# Patient Record
Sex: FEMALE | Race: WHITE | NOT HISPANIC OR LATINO | Employment: UNEMPLOYED | URBAN - METROPOLITAN AREA
[De-identification: names, ages, dates, MRNs, and addresses within clinical notes are randomized per-mention and may not be internally consistent; named-entity substitution may affect disease eponyms.]

---

## 2017-07-12 ENCOUNTER — TRANSCRIBE ORDERS (OUTPATIENT)
Dept: ADMINISTRATIVE | Facility: HOSPITAL | Age: 67
End: 2017-07-12

## 2017-07-12 DIAGNOSIS — R20.0 NUMBNESS: ICD-10-CM

## 2017-07-12 DIAGNOSIS — M54.2 NECK PAIN: Primary | ICD-10-CM

## 2017-07-18 ENCOUNTER — HOSPITAL ENCOUNTER (OUTPATIENT)
Dept: RADIOLOGY | Facility: HOSPITAL | Age: 67
Discharge: HOME | End: 2017-07-18
Attending: INTERNAL MEDICINE
Payer: MEDICARE

## 2017-07-18 DIAGNOSIS — M54.2 NECK PAIN: ICD-10-CM

## 2017-07-18 DIAGNOSIS — R20.0 NUMBNESS: ICD-10-CM

## 2017-07-18 PROCEDURE — 72141 MRI NECK SPINE W/O DYE: CPT

## 2017-11-14 ENCOUNTER — TRANSCRIBE ORDERS (OUTPATIENT)
Dept: ADMINISTRATIVE | Facility: HOSPITAL | Age: 67
End: 2017-11-14

## 2017-11-14 DIAGNOSIS — Z12.31 VISIT FOR SCREENING MAMMOGRAM: Primary | ICD-10-CM

## 2017-11-14 DIAGNOSIS — M81.0 SENILE OSTEOPOROSIS: ICD-10-CM

## 2017-11-27 ENCOUNTER — HOSPITAL ENCOUNTER (OUTPATIENT)
Dept: RADIOLOGY | Facility: HOSPITAL | Age: 67
Discharge: HOME | End: 2017-11-27
Attending: OBSTETRICS & GYNECOLOGY
Payer: MEDICARE

## 2017-11-27 DIAGNOSIS — Z12.31 VISIT FOR SCREENING MAMMOGRAM: ICD-10-CM

## 2017-11-27 DIAGNOSIS — M81.0 SENILE OSTEOPOROSIS: ICD-10-CM

## 2017-11-27 PROCEDURE — G0202 SCR MAMMO BI INCL CAD: HCPCS

## 2017-11-27 PROCEDURE — 77080 DXA BONE DENSITY AXIAL: CPT

## 2018-04-02 ENCOUNTER — HOSPITAL ENCOUNTER (OUTPATIENT)
Dept: RADIOLOGY | Facility: HOSPITAL | Age: 68
Discharge: HOME | End: 2018-04-02
Attending: INTERNAL MEDICINE
Payer: MEDICARE

## 2018-04-02 ENCOUNTER — TRANSCRIBE ORDERS (OUTPATIENT)
Dept: ADMINISTRATIVE | Facility: HOSPITAL | Age: 68
End: 2018-04-02

## 2018-04-02 DIAGNOSIS — R05.9 COUGH: Primary | ICD-10-CM

## 2018-04-02 PROCEDURE — 71046 X-RAY EXAM CHEST 2 VIEWS: CPT

## 2018-12-01 ENCOUNTER — TRANSCRIBE ORDERS (OUTPATIENT)
Dept: ADMINISTRATIVE | Facility: HOSPITAL | Age: 68
End: 2018-12-01

## 2018-12-01 DIAGNOSIS — Z12.39 SCREENING BREAST EXAMINATION: Primary | ICD-10-CM

## 2018-12-17 ENCOUNTER — HOSPITAL ENCOUNTER (OUTPATIENT)
Dept: RADIOLOGY | Facility: HOSPITAL | Age: 68
Discharge: HOME | End: 2018-12-17
Attending: OBSTETRICS & GYNECOLOGY
Payer: MEDICARE

## 2018-12-17 VITALS — BODY MASS INDEX: 23.32 KG/M2 | WEIGHT: 140 LBS | HEIGHT: 65 IN

## 2018-12-17 DIAGNOSIS — Z12.39 SCREENING BREAST EXAMINATION: ICD-10-CM

## 2018-12-17 PROCEDURE — 77067 SCR MAMMO BI INCL CAD: CPT

## 2018-12-17 PROCEDURE — 77063 BREAST TOMOSYNTHESIS BI: CPT

## 2019-08-03 ENCOUNTER — OFFICE VISIT (OUTPATIENT)
Dept: SLEEP CENTER | Facility: CLINIC | Age: 69
End: 2019-08-03
Payer: COMMERCIAL

## 2019-08-03 VITALS
SYSTOLIC BLOOD PRESSURE: 143 MMHG | WEIGHT: 149 LBS | DIASTOLIC BLOOD PRESSURE: 65 MMHG | BODY MASS INDEX: 24.83 KG/M2 | HEIGHT: 65 IN | HEART RATE: 61 BPM

## 2019-08-03 DIAGNOSIS — G47.33 OSA (OBSTRUCTIVE SLEEP APNEA): Primary | ICD-10-CM

## 2019-08-03 DIAGNOSIS — Z91.09 ENVIRONMENTAL ALLERGIES: ICD-10-CM

## 2019-08-03 DIAGNOSIS — R06.83 SNORING: ICD-10-CM

## 2019-08-03 DIAGNOSIS — I25.10 CORONARY ARTERY DISEASE INVOLVING NATIVE CORONARY ARTERY OF NATIVE HEART WITHOUT ANGINA PECTORIS: ICD-10-CM

## 2019-08-03 DIAGNOSIS — G47.09 OTHER INSOMNIA: ICD-10-CM

## 2019-08-03 DIAGNOSIS — R40.0 DAYTIME SLEEPINESS: ICD-10-CM

## 2019-08-03 DIAGNOSIS — R23.2 HOT FLASHES: ICD-10-CM

## 2019-08-03 PROCEDURE — 99204 OFFICE O/P NEW MOD 45 MIN: CPT | Performed by: INTERNAL MEDICINE

## 2019-08-03 NOTE — PROGRESS NOTES
Review of Systems      Genitourinary post menopausal (no peroids)   Cardiology none   Gastrointestinal none   Neurology none   Constitutional fatigue   Integumentary none   Psychiatry none   Musculoskeletal none   Pulmonary snoring   ENT none   Endocrine none   Hematological none I will STOP taking the medications listed below when I get home from the hospital:    spironolactone 25 mg oral tablet  -- 1 tab(s) by mouth once a day (at bedtime)

## 2019-08-03 NOTE — PATIENT INSTRUCTIONS
What you can do to improve your sleep: (Sleep Hygiene) Basic rules for a good night's sleep    Create a regular sleep schedule  This will help you form a sleep routine  Keep a record of your sleep patterns, and any sleeping problems you have  Bring the record to follow-up visits with healthcare providers  Avoid prolonged use of light-emitting screens before bedtime or watching TV in bed  Avoid forcing sleep  Do not take naps  Naps could make it hard for you to fall asleep at bedtime  Deal with your worries before bedtime  Keep your bedroom cool, quiet, and dark  Turn on white noise, such as a fan, to help you relax  Do not use your bed for any activity that will keep you awake  Do not read, exercise, eat, or watch TV in your bedroom  Get up if you do not fall asleep within 20 minutes  Move to another room and do something relaxing until you become sleepy  Limit caffeine, alcohol, nicotine and food to earlier in the day  Only drink caffeine in the morning  Do not drink alcohol within 6 hours of bedtime  Do not eat a heavy meal right before you go to bed  Avoid smoking, especially in the evening  Exercise regularly  Daily exercise will help you sleep better  Do not exercise within 4 hours of bedtime  Stimulus control therapy rules  1  Go to bed only when sleepy  2  Do not watch television, read, eat, or worry while in bed  Use bed only for sleep and sex  3  Get out of bed if unable to fall asleep within 20 minutes and go to another room  Return to bed only when sleepy  Repeat this step as many times as necessary throughout the night  4  Set an alarm clock to wake up at a fixed time each morning, including weekends  5  Do not take a nap during the day  Data from: [address] [place] [address], [address] Nonpharmacologic treatments of insomnia  J Clin Psychiatry 8479; 53:37  Go to AASM website for more information: Sleepeducation  org     Recommended Reading:  Book by marques Alonso No More sleepless nights    What is NELL? Obstructive sleep apnea is a common and serious sleep disorder that causes you to stop breathing during sleep  The airway repeatedly becomes blocked, limiting the amount of air that reaches your lungs  When this happens, you may snore loudly or making choking noises as you try to breathe  Your brain and body becomes oxygen deprived and you may wake up  This may happen a few times a night, or in more severe cases, several hundred times a night  Sleep apnea can make you wake up in the morning feeling tired or unrefreshed even though you have had a full night of sleep  During the day, you may feel fatigued, have difficulty concentrating or you may even unintentionally fall asleep  This is because your body is waking up numerous times throughout the night, even though you might not be conscious of each awakening  The lack of oxygen your body receives can have negative long-term consequences for your health  This includes:  High blood pressure  Heart disease  Irregular heart rhythms  Stroke  Pre-diabetes and diabetes  Depression    Testing  An objective evaluation of your sleep may be needed before your board certified sleep physician can make a diagnosis  Options include:   In-lab overnight sleep study  This type of sleep study requires you to stay overnight at a sleep center, in a bed that may resemble a hotel room  You will sleep with sensors hooked up to various parts of your body  These sensors record your brain waves, heartbeat, breathing and movement  An overnight sleep study also provides your doctor with the most complete information about your sleep   Learn more about an overnight sleep study      Home sleep apnea test  Some patients with high risk factors for obstructive sleep apnea and no other medical disorders may be candidates for a home sleep apnea test  The testing equipment differs in that it is less complicated than what is used in an overnight sleep study  As such, does not give all the data an in-lab will and if negative, may not mean you do not have the problem  Treatment for sleep apnea  includes using a continuous positive airway pressure (CPAP) machine to keep your airway open during sleep  A mask is placed over your nose and mouth, or just your nose  The mask is hooked to the CPAP machine that blows a gentle stream of air into the mask when you breathe  This helps keep your airway open so you can breathe more regularly  Extra oxygen may be given to you through the machine  You may be given a mouth device  It looks like a mouth guard or dental retainer and stops your tongue and mouth tissues from blocking your throat while you sleep  Surgery may be needed to remove extra tissues that block your mouth, throat, or nose  Manage sleep apnea:   Do not smoke  Nicotine and other chemicals in cigarettes and cigars can cause lung damage  Ask your healthcare provider for information if you currently smoke and need help to quit  E-cigarettes or smokeless tobacco still contain nicotine  Talk to your healthcare provider before you use these products  Do not drink alcohol or take sedative medicine before you go to sleep  Alcohol and sedatives can relax the muscles and tissues around your throat  This can block the airflow to your lungs  Maintain a healthy weight  Excess tissue around your throat may restrict your breathing  Ask your healthcare provider for information if you need to lose weight  Sleep on your side or use pillows designed to prevent sleep apnea  This prevents your tongue or other tissues from blocking your throat  You can also raise the head of your bed  Driving Safety  Refrain from driving when drowsy  Follow up with your healthcare provider as directed:  Write down your questions so you remember to ask them during your visits  Go to AASM website for more information: Sleepeducation  org     What is NELL?    Obstructive sleep apnea is a common and serious sleep disorder that causes you to stop breathing during sleep  The airway repeatedly becomes blocked, limiting the amount of air that reaches your lungs  When this happens, you may snore loudly or making choking noises as you try to breathe  Your brain and body becomes oxygen deprived and you may wake up  This may happen a few times a night, or in more severe cases, several hundred times a night  Sleep apnea can make you wake up in the morning feeling tired or unrefreshed even though you have had a full night of sleep  During the day, you may feel fatigued, have difficulty concentrating or you may even unintentionally fall asleep  This is because your body is waking up numerous times throughout the night, even though you might not be conscious of each awakening  The lack of oxygen your body receives can have negative long-term consequences for your health  This includes:  High blood pressure  Heart disease  Irregular heart rhythms  Stroke  Pre-diabetes and diabetes  Depression    Testing  An objective evaluation of your sleep may be needed before your board certified sleep physician can make a diagnosis  Options include:   In-lab overnight sleep study  This type of sleep study requires you to stay overnight at a sleep center, in a bed that may resemble a hotel room  You will sleep with sensors hooked up to various parts of your body  These sensors record your brain waves, heartbeat, breathing and movement  An overnight sleep study also provides your doctor with the most complete information about your sleep  Learn more about an overnight sleep study      Home sleep apnea test  Some patients with high risk factors for obstructive sleep apnea and no other medical disorders may be candidates for a home sleep apnea test  The testing equipment differs in that it is less complicated than what is used in an overnight sleep study   As such, does not give all the data an in-lab will and if negative, may not mean you do not have the problem  Treatment for sleep apnea  includes using a continuous positive airway pressure (CPAP) machine to keep your airway open during sleep  A mask is placed over your nose and mouth, or just your nose  The mask is hooked to the CPAP machine that blows a gentle stream of air into the mask when you breathe  This helps keep your airway open so you can breathe more regularly  Extra oxygen may be given to you through the machine  You may be given a mouth device  It looks like a mouth guard or dental retainer and stops your tongue and mouth tissues from blocking your throat while you sleep  Surgery may be needed to remove extra tissues that block your mouth, throat, or nose  Manage sleep apnea:   Do not smoke  Nicotine and other chemicals in cigarettes and cigars can cause lung damage  Ask your healthcare provider for information if you currently smoke and need help to quit  E-cigarettes or smokeless tobacco still contain nicotine  Talk to your healthcare provider before you use these products  Do not drink alcohol or take sedative medicine before you go to sleep  Alcohol and sedatives can relax the muscles and tissues around your throat  This can block the airflow to your lungs  Maintain a healthy weight  Excess tissue around your throat may restrict your breathing  Ask your healthcare provider for information if you need to lose weight  Sleep on your side or use pillows designed to prevent sleep apnea  This prevents your tongue or other tissues from blocking your throat  You can also raise the head of your bed  Driving Safety  Refrain from driving when drowsy  Follow up with your healthcare provider as directed:  Write down your questions so you remember to ask them during your visits  Go to AASM website for more information: Sleepeducation  org

## 2019-08-03 NOTE — PROGRESS NOTES
Consultation - [facility]  81 y o  female  [phone]    Physician Requesting Consult: Mateus Guevara MD   PCP: Jose Manuel Dillard MD    Reason for Consult : At your kind request I saw this patient for initial sleep evaluation today  The patient is here to evaluate for suspected Obstructive Sleep Apnea  She has had several studies in the past:  A diagnostic sleep study in 2012 demonstrated an AHI of 19 9 per hour with minimum oxygen saturation of 92%  Very loud intensity was snoring was noted and was independently associated with arousal   A subsequent diagnostic study in 2017 demonstrated an AHI of 0 2 per hour with mild intermittent snoring  There were 2 respiratory effort-related arousals resulting in an RDI of 0 6 per hour  Minimum oxygen saturation was 91%  Sleep efficiency was reduced at 67% ECG demonstrated bradycardia, PVCs and PACs          PFSH, Problem List, Medications & Allergies were reviewed in EMR  She  has a past medical history of Stomach problems  She has a current medication list which includes the following prescription(s): esomeprazole, metoprolol succinate, pseudoephedrine-acetaminophen, and rosuvastatin  HPI:  She reports symptom recurrence and progressing over time  She  is once again snoring loudly and irrespective of her sleep position  Snoring disturbs her  and at times she awakens herself with snoring  She is not aware of breathing difficulties during sleep or modifying factors  Other Complaints:  Tiredness and poor sleep  Yasmin Campa Restless Leg Syndrome: reports no suggestive symptoms but moves excessively during sleep  Parasomnia: no features reported   Sleep Routine:   Typical Bedtime:  11:00 p m  Gets OOB:  8:00 a m  TIB:9 hrs  Sleep latency:> 60 minutes and attributes to my mind does not turn off  At times she has the TV on to assist falling asleep    Sleep Interruptions:2-3 x/night is unsure of the cause and struggles to fall back asleep  Awakens: spontaneously  Upon awakening:never feels rested She estimates getting 5 hrs sleep  She has Excessive Daytime Sleepiness and dozes off when sedentary  On review, [place] Sleepiness Scale rated at 12 /24  Habits: reports that she has never smoked  She has never used smokeless tobacco , reports that she drinks alcohol ,  reports that she does not use drugs  ,Caffeine use:limited , Exercise routine: regular    Family History:  Father and siblings snored  ROS: reviewed & as attached  Significant for intentional weight loss of around 15 lb in the past 1 year  She has environmental allergies for which she uses Xyzal p r n  Samantha Cutting She has hot flashes that disturb sleep  She is on medication for acid reflux  EXAM:    Vitals /65   Pulse 61   Ht 5' 5" (1 651 m)   Wt 67 6 kg (149 lb)   BMI 24 79 kg/m²     General  Well groomed female; appears stated age; no apparent distress  Psychiatric   Speech:clear and coherent; Cooperative  Normal mood, affect & thought    Head   Craniofacial anatomy:normal Sinuses: non- tender  TMJ: Normal     Eyes   EOM's intact;  conjunctiva/corneas clear         Nasal Airway  narrow nares Septum:central, Mucous membranes:appear normal     Turbinates: normal ;  No Rhinorrhea; No PND  Oral   Airway  crowded Tongue:  ; Modified Mallampati class III (soft and hard palate and base of uvula visible)  Hard Palate:normal ;Soft Palate:  redundant soft palate  Tonsils: no hypertrophy  Teeth: normal       Neck  is lean; Neck Circumference: 14 5 "; Supple; no abnormal masses; Thyroid:normal  Trachea:central      Lymph    No Cervical or Submandibular Lymhadenopathy   Heart:   S1,S2 normal;RRR; no gallop; nomurmurs     Lungs   Respiratory Effort:normal  Air entry good bilaterally  No wheezes  No rales   Abdomen   Obese, Soft & non-tender     Extremities   No pedal edema  No clubbing or cyanosis  Skin   Skin is warm and dry;  Color& Hydration good; no facial rashes or lesions    Neurologic  Alert and orientated; CNII-XII intact; Motor normal; Sensation normal    Muscskeltl    Muscle bulk, tone and power WNL Gait:normal           IMPRESSION: Primary Sleep/Secondary(to Medical or Psych conditions) & comorbidities   1  NELL (obstructive sleep apnea)  Diagnostic Sleep Study    CPAP Study   2  Snoring  Ambulatory referral to Sleep Studies   3  Daytime sleepiness  Ambulatory referral to Sleep Studies   4  Other insomnia     5  Environmental allergies     6  Hot flashes     7  Coronary artery disease involving native coronary artery of native heart without angina pectoris        PLAN:  1  Comprehensive counseling was provided on pathophysiology, diagnostic strategies & treatment options; effects on symptoms and comorbidities; risks of inadequate therapy; costs and insurance aspects  2  I advised on weight reduction, avoiding sleeping supine, using alcohol or sedating medications close to bed time and on safe driving practices  3  Cognitive behavioral therapy was initiated with advise on Sleep Hygiene and behavioral techniques to manage Insomnia  Specifically, limiting time in bed to 7-1/2 hours or less and on relaxation techniques  4  Nocturnal polysomnography is indicated and a diagnostic study will be scheduled  5  Patient is willing to try Positive airway pressure therapy and will be scheduled for a titration study if indicated  6  Nasal symptoms may improve with regular nasal saline rinse followed by topical nasal steroid if necessary  6  Follow-up will be scheduled after the studies to review results, further details of treatment options and to initiate/adjust therapy  Thank you for allowing me to participate in the care of this patient  I will keep you apprised of developments              Sincerely,     Authenticated electronically by Clifford Kaplan MD   on 76/94/62   Board Certified Specialist

## 2019-08-21 ENCOUNTER — TELEPHONE (OUTPATIENT)
Dept: SLEEP CENTER | Facility: CLINIC | Age: 69
End: 2019-08-21

## 2019-08-21 ENCOUNTER — TRANSCRIBE ORDERS (OUTPATIENT)
Dept: SLEEP CENTER | Facility: CLINIC | Age: 69
End: 2019-08-21

## 2019-08-26 DIAGNOSIS — G47.33 OSA (OBSTRUCTIVE SLEEP APNEA): Primary | ICD-10-CM

## 2019-09-03 ENCOUNTER — HOSPITAL ENCOUNTER (OUTPATIENT)
Dept: SLEEP CENTER | Facility: CLINIC | Age: 69
Discharge: HOME | End: 2019-09-03
Payer: COMMERCIAL

## 2019-09-03 DIAGNOSIS — G47.33 OSA (OBSTRUCTIVE SLEEP APNEA): ICD-10-CM

## 2019-09-03 PROCEDURE — G0399 HOME SLEEP TEST/TYPE 3 PORTA: HCPCS

## 2019-09-09 DIAGNOSIS — G47.33 OSA (OBSTRUCTIVE SLEEP APNEA): Primary | ICD-10-CM

## 2019-09-10 ENCOUNTER — TELEPHONE (OUTPATIENT)
Dept: SLEEP CENTER | Facility: CLINIC | Age: 69
End: 2019-09-10

## 2019-09-10 NOTE — TELEPHONE ENCOUNTER
Spoke with patient, advised sleep study did not reveal NELL, cancelled CPAP study  Patient will call as needed

## 2019-11-22 ENCOUNTER — TRANSCRIBE ORDERS (OUTPATIENT)
Dept: ADMINISTRATIVE | Facility: HOSPITAL | Age: 69
End: 2019-11-22

## 2019-11-22 DIAGNOSIS — Z12.31 SCREENING MAMMOGRAM, ENCOUNTER FOR: Primary | ICD-10-CM

## 2020-01-07 ENCOUNTER — HOSPITAL ENCOUNTER (OUTPATIENT)
Dept: RADIOLOGY | Facility: HOSPITAL | Age: 70
Discharge: HOME | End: 2020-01-07
Attending: OBSTETRICS & GYNECOLOGY
Payer: COMMERCIAL

## 2020-01-07 VITALS — BODY MASS INDEX: 24.83 KG/M2 | WEIGHT: 149 LBS | HEIGHT: 65 IN

## 2020-01-07 DIAGNOSIS — Z12.31 SCREENING MAMMOGRAM, ENCOUNTER FOR: ICD-10-CM

## 2020-01-07 PROCEDURE — 77063 BREAST TOMOSYNTHESIS BI: CPT

## 2020-01-07 PROCEDURE — 77067 SCR MAMMO BI INCL CAD: CPT

## 2020-11-20 ENCOUNTER — TRANSCRIBE ORDERS (OUTPATIENT)
Dept: ADMINISTRATIVE | Facility: HOSPITAL | Age: 70
End: 2020-11-20

## 2020-11-20 DIAGNOSIS — Z12.31 ENCOUNTER FOR SCREENING MAMMOGRAM FOR MALIGNANT NEOPLASM OF BREAST: Primary | ICD-10-CM

## 2021-01-14 ENCOUNTER — HOSPITAL ENCOUNTER (OUTPATIENT)
Dept: RADIOLOGY | Facility: HOSPITAL | Age: 71
Discharge: HOME | End: 2021-01-14
Attending: OBSTETRICS & GYNECOLOGY
Payer: COMMERCIAL

## 2021-01-14 ENCOUNTER — HOSPITAL ENCOUNTER (OUTPATIENT)
Dept: RADIOLOGY | Facility: HOSPITAL | Age: 71
Discharge: HOME | End: 2021-01-14
Payer: COMMERCIAL

## 2021-01-14 VITALS — WEIGHT: 140 LBS | BODY MASS INDEX: 23.32 KG/M2 | HEIGHT: 65 IN

## 2021-01-14 DIAGNOSIS — N63.20 LUMP OF BREAST, LEFT: ICD-10-CM

## 2021-01-14 DIAGNOSIS — Z12.31 ENCOUNTER FOR SCREENING MAMMOGRAM FOR MALIGNANT NEOPLASM OF BREAST: ICD-10-CM

## 2021-01-14 PROCEDURE — 77066 DX MAMMO INCL CAD BI: CPT

## 2021-01-14 PROCEDURE — 76642 ULTRASOUND BREAST LIMITED: CPT

## 2021-01-14 PROCEDURE — G0279 TOMOSYNTHESIS, MAMMO: HCPCS

## 2021-03-01 DIAGNOSIS — Z23 ENCOUNTER FOR IMMUNIZATION: ICD-10-CM

## 2021-03-05 ENCOUNTER — IMMUNIZATIONS (OUTPATIENT)
Dept: FAMILY MEDICINE CLINIC | Facility: HOSPITAL | Age: 71
End: 2021-03-05

## 2021-03-05 DIAGNOSIS — Z23 ENCOUNTER FOR IMMUNIZATION: Primary | ICD-10-CM

## 2021-03-05 PROCEDURE — 91301 SARS-COV-2 / COVID-19 MRNA VACCINE (MODERNA) 100 MCG: CPT

## 2021-03-05 PROCEDURE — 0011A SARS-COV-2 / COVID-19 MRNA VACCINE (MODERNA) 100 MCG: CPT

## 2021-04-02 ENCOUNTER — IMMUNIZATIONS (OUTPATIENT)
Dept: FAMILY MEDICINE CLINIC | Facility: HOSPITAL | Age: 71
End: 2021-04-02

## 2021-04-02 DIAGNOSIS — Z23 ENCOUNTER FOR IMMUNIZATION: Primary | ICD-10-CM

## 2021-04-02 PROCEDURE — 0012A SARS-COV-2 / COVID-19 MRNA VACCINE (MODERNA) 100 MCG: CPT

## 2021-04-02 PROCEDURE — 91301 SARS-COV-2 / COVID-19 MRNA VACCINE (MODERNA) 100 MCG: CPT

## 2022-06-27 ENCOUNTER — HOSPITAL ENCOUNTER (OUTPATIENT)
Dept: RADIOLOGY | Facility: HOSPITAL | Age: 72
Discharge: HOME | End: 2022-06-27
Payer: COMMERCIAL

## 2022-06-27 VITALS — BODY MASS INDEX: 23.32 KG/M2 | WEIGHT: 140 LBS | HEIGHT: 65 IN

## 2022-06-27 DIAGNOSIS — Z12.31 ENCOUNTER FOR SCREENING MAMMOGRAM FOR MALIGNANT NEOPLASM OF BREAST: ICD-10-CM

## 2022-06-27 PROCEDURE — 77063 BREAST TOMOSYNTHESIS BI: CPT

## 2022-06-27 PROCEDURE — 77067 SCR MAMMO BI INCL CAD: CPT

## 2022-12-22 ENCOUNTER — TELEPHONE (OUTPATIENT)
Dept: GASTROENTEROLOGY | Facility: CLINIC | Age: 72
End: 2022-12-22

## 2022-12-22 ENCOUNTER — OFFICE VISIT (OUTPATIENT)
Dept: GASTROENTEROLOGY | Facility: CLINIC | Age: 72
End: 2022-12-22

## 2022-12-22 VITALS
HEIGHT: 65 IN | BODY MASS INDEX: 25.51 KG/M2 | WEIGHT: 153.1 LBS | SYSTOLIC BLOOD PRESSURE: 144 MMHG | HEART RATE: 52 BPM | DIASTOLIC BLOOD PRESSURE: 77 MMHG

## 2022-12-22 DIAGNOSIS — K21.9 GASTROESOPHAGEAL REFLUX DISEASE WITHOUT ESOPHAGITIS: ICD-10-CM

## 2022-12-22 DIAGNOSIS — R19.5 OCCULT BLOOD POSITIVE STOOL: Primary | ICD-10-CM

## 2022-12-22 RX ORDER — POLYETHYLENE GLYCOL 3350, SODIUM SULFATE ANHYDROUS, SODIUM BICARBONATE, SODIUM CHLORIDE, POTASSIUM CHLORIDE 236; 22.74; 6.74; 5.86; 2.97 G/4L; G/4L; G/4L; G/4L; G/4L
4 POWDER, FOR SOLUTION ORAL ONCE
Qty: 4000 ML | Refills: 0 | Status: SHIPPED | OUTPATIENT
Start: 2022-12-22 | End: 2022-12-22

## 2022-12-22 RX ORDER — ASPIRIN 81 MG/1
81 TABLET ORAL DAILY
COMMUNITY

## 2022-12-22 RX ORDER — LISINOPRIL 2.5 MG/1
1.5 TABLET ORAL DAILY
COMMUNITY
Start: 2020-03-31

## 2022-12-22 NOTE — PROGRESS NOTES
[facility] - Outpatient Consultation  Lexus Cea 81 y o  female MRN: [id number]  Encounter: [id number]          ASSESSMENT AND PLAN:      1  Occult blood positive stool  -     Colonoscopy; Future; Expected date: 12/22/2022  -     EGD; Future; Expected date: 12/22/2022  -     polyethylene glycol (Golytely) 4000 mL solution; Take 4,000 mL by mouth once for 1 dose Take according to instructions given by the office for colonoscopy bowel prep  2  Gastroesophageal reflux disease without esophagitis  -     Colonoscopy; Future; Expected date: 12/22/2022  -     EGD; Future; Expected date: 12/22/2022  -     polyethylene glycol (Golytely) 4000 mL solution; Take 4,000 mL by mouth once for 1 dose Take according to instructions given by the office for colonoscopy bowel prep  Patient with FIT positive on routine screening for colorectal cancer, does not recall the last colonoscopy, at least 10 years ago, those records not available  Patient denies any overt GI blood loss or other GI symptoms, history of chronic constipation  Also has chronic acid reflux, on PPI for several years  History of LAD stents, year ago nuclear stress test was read as normal   Patient needs EGD colonoscopy evaluation, procedure and prep discussed at length  She will start taking MiraLAX at least a week before procedure because of history of severe constipation  Her cardiac status seems to be stable, I do not see need for cardiac clearance  Discussed with patient and her  at length   ______________________________________________________________________    HPI:     Patient came in for evaluation of her FIT positive  She denies any apparent blood in stools, no melena hematochezia, denies any hematemesis hemoptysis  Appetite is fair weight stable bowels generally constipated, sometimes does not move her bowels for 4 to 6 days    Does have some heartburn acid reflux indigestion been on PPI for a long time   Denies any dysphagia coughing choking spells nocturnal reflux regurgitation bronchitis pneumonias  Denies any chest pain shortness of breath fever chills rash, reasonably active, history of LAD stents many years ago, sees cardiologist annually  Diet medications more than 10 pertinent systems reviewed  REVIEW OF SYSTEMS:    CONSTITUTIONAL: Denies any fever, chills, rigors, and weight loss  HEENT: No earache or tinnitus  CARDIOVASCULAR: No chest pain or palpitations  RESPIRATORY: Denies any cough, hemoptysis, shortness of breath or dyspnea on exertion  GASTROINTESTINAL: As noted in the History of Present Illness  GENITOURINARY: Denies any hematuria or dysuria  NEUROLOGIC: No dizziness or vertigo  MUSCULOSKELETAL: Denies any joint swellings  SKIN: Denies skin rashes or itching  ENDOCRINE: Denies excessive thirst  Denies intolerance to heat or cold  PSYCHOSOCIAL: Denies depression or anxiety  Denies any recent memory loss         Historical Information   Past Medical History:   Diagnosis Date   • BRCA1 negative    • Hyperlipidemia    • Hypertension    • Stomach problems      Past Surgical History:   Procedure Laterality Date   • ARTHROSCOPIC REPAIR ACL     • CAROTID STENT     • COLONOSCOPY       Social History   Social History     Substance and Sexual Activity   Alcohol Use Yes    Comment: Rarely      Social History     Substance and Sexual Activity   Drug Use Never     Social History     Tobacco Use   Smoking Status Never   Smokeless Tobacco Never     Family History   Problem Relation Age of Onset   • Breast cancer Maternal Aunt    • Breast cancer Maternal Aunt    • Breast cancer Maternal Aunt    • Heart disease Other    • Congenital heart disease Father        Meds/Allergies       Current Outpatient Medications:   •  esomeprazole (NexIUM) 40 MG capsule  •  lisinopril (ZESTRIL) 2 5 mg tablet  •  metoprolol succinate (TOPROL-XL) 25 mg 24 hr tablet  •  polyethylene glycol (Golytely) 4000 mL solution  •  rosuvastatin (CRESTOR) 20 MG tablet  •  aspirin (ECOTRIN LOW STRENGTH) 81 mg EC tablet  •  pseudoephedrine-acetaminophen (TYLENOL SINUS)  MG TABS    No Known Allergies        Objective     Blood pressure 144/77, pulse (!) 52, height 5' 5" (1 651 m), weight 69 4 kg (153 lb 1 6 oz)  Body mass index is 25 48 kg/m²  PHYSICAL EXAM:      General Appearance:   Alert, cooperative, no distress   HEENT:   Normocephalic, atraumatic, anicteric  Neck:  Supple, symmetrical, trachea midline   Lungs:   Clear to auscultation bilaterally; no rales, rhonchi or wheezing; respirations unlabored    Heart[de-identified]   Regular rate and rhythm; no murmur  Abdomen:   Soft, non-tender, non-distended; normal bowel sounds; no masses, no organomegaly    Genitalia:   Deferred    Rectal:   Deferred    Extremities:  No cyanosis, clubbing or edema    Skin:  No jaundice, rashes, or lesions    Lymph nodes:  No palpable cervical lymphadenopathy        Lab Results:   No visits with results within 1 Day(s) from this visit  Latest known visit with results is:   Conversion Encounter on 10/05/2015   Component Date Value   • eGFR  10/05/2015 >60    • EGFR-AMERICAN CALC 10/05/2015 >60          Radiology Results:   No results found

## 2022-12-22 NOTE — PATIENT INSTRUCTIONS
Scheduled date of EGD/colonoscopy (as of today): 1/18/2023   Physician performing EGD/colonoscopy: elda  Location of EGD/colonoscopy[facility]  Desired bowel prep reviewed with patient:  Instructions reviewed with patient by: katherine  Clearances:   patient has been vaccinated

## 2023-01-12 ENCOUNTER — TELEPHONE (OUTPATIENT)
Dept: GASTROENTEROLOGY | Facility: CLINIC | Age: 73
End: 2023-01-12

## 2023-01-18 ENCOUNTER — APPOINTMENT (OUTPATIENT)
Dept: RADIOLOGY | Facility: CLINIC | Age: 73
End: 2023-01-18

## 2023-01-18 ENCOUNTER — OFFICE VISIT (OUTPATIENT)
Dept: URGENT CARE | Facility: CLINIC | Age: 73
End: 2023-01-18

## 2023-01-18 VITALS
RESPIRATION RATE: 16 BRPM | HEART RATE: 59 BPM | SYSTOLIC BLOOD PRESSURE: 152 MMHG | DIASTOLIC BLOOD PRESSURE: 67 MMHG | OXYGEN SATURATION: 98 % | TEMPERATURE: 98.9 F

## 2023-01-18 DIAGNOSIS — S69.92XA HAND INJURY, LEFT, INITIAL ENCOUNTER: ICD-10-CM

## 2023-01-18 DIAGNOSIS — S62.615A CLOSED DISPLACED FRACTURE OF PROXIMAL PHALANX OF LEFT RING FINGER, INITIAL ENCOUNTER: Primary | ICD-10-CM

## 2023-01-18 DIAGNOSIS — S62.617A CLOSED DISPLACED FRACTURE OF PROXIMAL PHALANX OF LEFT LITTLE FINGER, INITIAL ENCOUNTER: ICD-10-CM

## 2023-01-18 NOTE — PROGRESS NOTES
[id number] [facility]        NAME: Ninfa Mitcehll is a 81 y o  female  : 1950    MRN: [id number]  DATE: 2023  TIME: 12:41 PM    Assessment and Plan   Closed displaced fracture of proximal phalanx of left ring finger, initial encounter [[id number]]  1  Closed displaced fracture of proximal phalanx of left ring finger, initial encounter  XR hand 3+ vw left    Foreign body removal    Splint application      2  Closed displaced fracture of proximal phalanx of left little finger, initial encounter  XR hand 3+ vw left    Splint application        Utilized ring cutter due to location of fractures as well as significant swelling present  Pt has her own ortho she will call for appointment  Recommend jaiden [facility]  Discussed strict return to care precautions as well as red flag symptoms which should prompt immediate ED referral  Pt verbalized understanding and is in agreement with plan  Please follow up with your primary care provider within the next week  Please remember that your visit today was with an urgent care provider and should not replace follow up with your primary care provider for chronic medical issues or annual physicals  Patient Instructions       Follow up with PCP in 3-5 days  Proceed to  ER if symptoms worsen  Chief Complaint     Chief Complaint   Patient presents with   • Hand Injury     Nikki Prost on left hand this morning, left ring and pinky fingers swollen, limited ROM, mild pain in middle and index fingers, full snesation         History of Present Illness       Pt is a 82 yo female with pmh HTN, HLD pw L hand pain x 1 hour  Tripped over the door frame in the garage and had [address]  Attempted to get ring off of 4th finger immediately but it was too swollen  Denies head strike or LOC  Review of Systems   Review of Systems   Constitutional: Negative for chills and diaphoresis  Respiratory: Negative for shortness of breath  Cardiovascular: Negative for chest pain  Gastrointestinal: Negative for nausea and vomiting  Musculoskeletal: Positive for joint swelling  Negative for arthralgias, back pain, myalgias and neck pain  Neurological: Negative for weakness and numbness  Current Medications       Current Outpatient Medications:   •  aspirin (ECOTRIN LOW STRENGTH) 81 mg EC tablet, Take 81 mg by mouth daily, Disp: , Rfl:   •  esomeprazole (NexIUM) 40 MG capsule, , Disp: , Rfl:   •  lisinopril (ZESTRIL) 2 5 mg tablet, Take 1 5 tablets by mouth daily, Disp: , Rfl:   •  metoprolol succinate (TOPROL-XL) 25 mg 24 hr tablet, , Disp: , Rfl:   •  rosuvastatin (CRESTOR) 20 MG tablet, , Disp: , Rfl:   •  polyethylene glycol (Golytely) 4000 mL solution, Take 4,000 mL by mouth once for 1 dose Take according to instructions given by the office for colonoscopy bowel prep , Disp: 4000 mL, Rfl: 0  •  pseudoephedrine-acetaminophen (TYLENOL SINUS)  MG TABS, Take 1 tablet by mouth every 4 (four) hours as needed for congestion, Disp: , Rfl:     Current Allergies     Allergies as of 01/18/2023   • (No Known Allergies)            The following portions of the patient's history were reviewed and updated as appropriate: allergies, current medications, past family history, past medical history, past social history, past surgical history and problem list      Past Medical History:   Diagnosis Date   • BRCA1 negative    • Hyperlipidemia    • Hypertension    • Stomach problems        Past Surgical History:   Procedure Laterality Date   • ARTHROSCOPIC REPAIR ACL     • CAROTID STENT     • COLONOSCOPY         Family History   Problem Relation Age of Onset   • Breast cancer Maternal Aunt    • Breast cancer Maternal Aunt    • Breast cancer Maternal Aunt    • Heart disease Other    • Congenital heart disease Father          Medications have been verified          Objective   /67   Pulse 59   Temp 98 9 °F (37 2 °C)   Resp 16   SpO2 98%          Physical Exam     Physical Exam  Vitals and nursing note reviewed  Constitutional:       General: She is not in acute distress  Appearance: Normal appearance  She is not ill-appearing  HENT:      Head: Normocephalic and atraumatic  Cardiovascular:      Rate and Rhythm: Normal rate  Pulmonary:      Effort: Pulmonary effort is normal  No respiratory distress  Musculoskeletal:      Left hand: Swelling, tenderness and bony tenderness present  Normal range of motion  Normal strength  Normal sensation  Normal capillary refill  Normal pulse  Comments: Ring stuck on 4th finger, surrounding tissue very swollen   Skin:     General: Skin is warm and dry  Capillary Refill: Capillary refill takes less than 2 seconds  Neurological:      Mental Status: She is alert and oriented to person, place, and time  Psychiatric:         Behavior: Behavior normal                Universal Protocol:  Procedure performed by: (TAYLER BRICE)  Consent: Verbal consent obtained  Risks and benefits: risks, benefits and alternatives were discussed  Consent given by: patient  Time out: Immediately prior to procedure a "time out" was called to verify the correct patient, procedure, equipment, support staff and site/side marked as required  Required items: required blood products, implants, devices, and special equipment available  Patient identity confirmed: verbally with patient    Foreign body removal    Date/Time: 1/18/2023 12:39 PM  Performed by: Elsy Bose PA-C  Authorized by: Elsy Bose PA-C   Body area: skin  General location: upper extremity  Location details: left ring finger  Localization method: visualized  Removal mechanism: hemostat (ring cutter)  Complexity: simple  1 objects recovered    Objects recovered: ring  Post-procedure assessment: foreign body removed  Patient tolerance: patient tolerated the procedure well with no immediate complications  Splint application    Date/Time: 1/18/2023 12:40 PM  Performed by: Elsy Bose IRMA  Authorized by: Tammy Bishop PA-C   Universal Protocol:  Procedure performed by: (LIZA AND TAYLER)  Consent: Verbal consent obtained  Risks and benefits: risks, benefits and alternatives were discussed  Consent given by: patient  Patient understanding: patient states understanding of the procedure being performed  Required items: required blood products, implants, devices, and special equipment available  Patient identity confirmed: verbally with patient      Pre-procedure details:     Sensation:  Normal  Procedure details:     Laterality:  Left    Location:  Hand    Hand:  L hand    Splint type:  Ulnar gutter    Supplies:  Ortho-Glass and cotton padding  Post-procedure details:     Pain:  Unchanged    Sensation:  Normal    Patient tolerance of procedure:   Tolerated well, no immediate complications

## 2023-02-07 ENCOUNTER — TELEPHONE (OUTPATIENT)
Dept: GASTROENTEROLOGY | Facility: AMBULARY SURGERY CENTER | Age: 73
End: 2023-02-07

## 2023-02-07 NOTE — TELEPHONE ENCOUNTER
Patients GI provider:  Dr Lidia Wells    Number to return call: (  [phone]    Reason for call: Pt broke her finger and currently has a hand splint   She is asking if it's okay to proceed with her colon    Scheduled procedure/appointment date if applicable: procedure  8/12/58

## 2023-02-07 NOTE — TELEPHONE ENCOUNTER
Called and spoke with Suzanne Nino at [facility] and it will not interfere at all with the colonoscopy  I called and spoke with patient and relayed the message that she is able to proceed with her colonoscopy despite having two broken fingers  Thank you!

## 2023-02-21 ENCOUNTER — HOSPITAL ENCOUNTER (OUTPATIENT)
Dept: GASTROENTEROLOGY | Facility: AMBULARY SURGERY CENTER | Age: 73
Setting detail: OUTPATIENT SURGERY
Discharge: HOME | End: 2023-02-21
Attending: INTERNAL MEDICINE

## 2023-02-21 ENCOUNTER — ANESTHESIA (OUTPATIENT)
Dept: GASTROENTEROLOGY | Facility: AMBULARY SURGERY CENTER | Age: 73
End: 2023-02-21

## 2023-02-21 ENCOUNTER — ANESTHESIA EVENT (OUTPATIENT)
Dept: GASTROENTEROLOGY | Facility: AMBULARY SURGERY CENTER | Age: 73
End: 2023-02-21

## 2023-02-21 VITALS
TEMPERATURE: 98 F | DIASTOLIC BLOOD PRESSURE: 71 MMHG | RESPIRATION RATE: 18 BRPM | HEART RATE: 58 BPM | OXYGEN SATURATION: 100 % | SYSTOLIC BLOOD PRESSURE: 166 MMHG

## 2023-02-21 DIAGNOSIS — R19.5 OCCULT BLOOD POSITIVE STOOL: ICD-10-CM

## 2023-02-21 DIAGNOSIS — K21.9 GASTROESOPHAGEAL REFLUX DISEASE WITHOUT ESOPHAGITIS: ICD-10-CM

## 2023-02-21 PROBLEM — Z98.61 HISTORY OF PTCA 2: Status: ACTIVE | Noted: 2020-03-12

## 2023-02-21 PROBLEM — E78.2 MIXED HYPERLIPIDEMIA: Status: ACTIVE | Noted: 2020-03-12

## 2023-02-21 PROBLEM — I25.10 CORONARY ARTERY DISEASE INVOLVING NATIVE CORONARY ARTERY OF NATIVE HEART WITHOUT ANGINA PECTORIS: Status: ACTIVE | Noted: 2020-03-12

## 2023-02-21 RX ORDER — LIDOCAINE HYDROCHLORIDE 10 MG/ML
0.5 INJECTION, SOLUTION EPIDURAL; INFILTRATION; INTRACAUDAL; PERINEURAL ONCE AS NEEDED
Status: DISCONTINUED | OUTPATIENT
Start: 2023-02-21 | End: 2023-02-25 | Stop reason: HOSPADM

## 2023-02-21 RX ORDER — PROPOFOL 10 MG/ML
INJECTION, EMULSION INTRAVENOUS AS NEEDED
Status: DISCONTINUED | OUTPATIENT
Start: 2023-02-21 | End: 2023-02-21

## 2023-02-21 RX ORDER — PROPOFOL 10 MG/ML
INJECTION, EMULSION INTRAVENOUS CONTINUOUS PRN
Status: DISCONTINUED | OUTPATIENT
Start: 2023-02-21 | End: 2023-02-21

## 2023-02-21 RX ORDER — SODIUM CHLORIDE, SODIUM LACTATE, POTASSIUM CHLORIDE, CALCIUM CHLORIDE 600; 310; 30; 20 MG/100ML; MG/100ML; MG/100ML; MG/100ML
50 INJECTION, SOLUTION INTRAVENOUS CONTINUOUS
Status: DISCONTINUED | OUTPATIENT
Start: 2023-02-21 | End: 2023-02-25 | Stop reason: HOSPADM

## 2023-02-21 RX ORDER — LIDOCAINE HYDROCHLORIDE 20 MG/ML
INJECTION, SOLUTION EPIDURAL; INFILTRATION; INTRACAUDAL; PERINEURAL AS NEEDED
Status: DISCONTINUED | OUTPATIENT
Start: 2023-02-21 | End: 2023-02-21

## 2023-02-21 RX ORDER — LIDOCAINE HYDROCHLORIDE 10 MG/ML
0.5 INJECTION, SOLUTION EPIDURAL; INFILTRATION; INTRACAUDAL; PERINEURAL ONCE AS NEEDED
Status: CANCELLED | OUTPATIENT
Start: 2023-02-21

## 2023-02-21 RX ORDER — SODIUM CHLORIDE, SODIUM LACTATE, POTASSIUM CHLORIDE, CALCIUM CHLORIDE 600; 310; 30; 20 MG/100ML; MG/100ML; MG/100ML; MG/100ML
50 INJECTION, SOLUTION INTRAVENOUS CONTINUOUS
Status: CANCELLED | OUTPATIENT
Start: 2023-02-21

## 2023-02-21 RX ADMIN — PROPOFOL 140 MCG/KG/MIN: 10 INJECTION, EMULSION INTRAVENOUS at 12:13

## 2023-02-21 RX ADMIN — PROPOFOL 100 MG: 10 INJECTION, EMULSION INTRAVENOUS at 12:06

## 2023-02-21 RX ADMIN — PROPOFOL 50 MG: 10 INJECTION, EMULSION INTRAVENOUS at 12:07

## 2023-02-21 RX ADMIN — SODIUM CHLORIDE, SODIUM LACTATE, POTASSIUM CHLORIDE, AND CALCIUM CHLORIDE 50 ML/HR: .6; .31; .03; .02 INJECTION, SOLUTION INTRAVENOUS at 12:02

## 2023-02-21 RX ADMIN — PROPOFOL 50 MG: 10 INJECTION, EMULSION INTRAVENOUS at 12:10

## 2023-02-21 RX ADMIN — LIDOCAINE HYDROCHLORIDE 70 MG: 20 INJECTION, SOLUTION EPIDURAL; INFILTRATION; INTRACAUDAL; PERINEURAL at 12:06

## 2023-02-21 NOTE — ANESTHESIA POSTPROCEDURE EVALUATION
Post-Op Assessment Note    CV Status:  Stable  Pain Score: 0    Pain management: adequate     Mental Status:  Alert and awake   Hydration Status:  Euvolemic   PONV Controlled:  Controlled   Airway Patency:  Patent      Post Op Vitals Reviewed: Yes      Staff: Anesthesiologist, CRNA         No notable events documented      BP   126/67   Temp      Pulse  67   Resp   14   SpO2   99

## 2023-02-21 NOTE — ANESTHESIA PREPROCEDURE EVALUATION
Procedure:  COLONOSCOPY  EGD    Relevant Problems   CARDIO   (+) Coronary artery disease involving native coronary artery of native heart without angina pectoris   (+) Mixed hyperlipidemia      PULMONARY   (+) NELL (obstructive sleep apnea)      Other   (+) History of PTCA 2   CAD status post PCI of LAD and RCA in 2015, then PCI of mid LAD in March 2020    Denies recent fever, cough or other symptom of upper respiratory tract infection  Confirmed NPO appropriate     Physical Exam    Airway    Mallampati score: II  TM Distance: >3 FB  Neck ROM: full     Dental   No notable dental hx     Cardiovascular      Pulmonary      Other Findings    10/1/21 Exercise Stress:  Impression:   1  Normal exercise Cardiolite SPECT the study with no evidence of   myocardial infarction or myocardial ischemia   2   Normal gated wall motion analysis with well-preserved systolic   function   Left ventricle is normal in size with calculated ejection   fraction of 72%   3   Negative EKG portion of the study  4   Exercise capacity above average; exercised for 11: 30 minutes   achieving 89% MPHR   5   Resting hypertension with hypertensive blood pressure response to   exercise; resting /102 with peak BP of 207/82  6   Normal heart rate response to exercise  7   No complex dysrhythmias during the study  10/1/21 TTE:  •  Left Ventricle: Systolic function is normal with an ejection fraction   of 55-60%  There is grade I (mild) diastolic dysfunction  •  Left Atrium: Left atrium cavity is mildly dilated  Left atrium volume   index is mildly increased  There is no thrombus in the left atrial cavity  There is no mass in the left atrial cavity  •  Aortic Valve: The aortic valve is trileaflet  The leaflets are mildly   thickened  The leaflets are mildly calcified  There is no regurgitation or   stenosis  •  Mitral Valve: The leaflets are mildly thickened  There is moderate   annular calcification   There is trace regurgitation  There is no evidence   of mitral valve stenosis  •  Tricuspid Valve: There is mild regurgitation  •  Pulmonic Valve: The estimated pulmonary artery systolic pressure is   78 5 mmHg  •  Ascending Aorta: The aorta appears normal in size  There is a small   amount of layered plaque in the ascending aorta  Anesthesia Plan  ASA Score- 3     Anesthesia Type- IV sedation with anesthesia with ASA Monitors  Additional Monitors:   Airway Plan:     Comment: I discussed the risks and benefits of IV sedation anesthesia including the possibility of the need to convert to general anesthesia and the potential risk of awareness  Plan Factors-Exercise tolerance (METS): >4 METS  Exercise comment: Able to climb flight of stairs without cardiopulmonary limitation  Chart reviewed  Existing labs reviewed  Patient summary reviewed  Patient is not a current smoker  Patient did not smoke on day of surgery  Induction- intravenous  Postoperative Plan-     Informed Consent- Anesthetic plan and risks discussed with patient

## 2023-02-21 NOTE — H&P
History and Physical -  [facility]  Teri Valencia 81 y o  female MRN: [id number]                  HPI: Teri Valencia is a 81y o  year old female who presents for heme positive stool      REVIEW OF SYSTEMS: Per the HPI, and otherwise unremarkable      Historical Information   Past Medical History:   Diagnosis Date   • BRCA1 negative    • Chronic constipation    • Colon polyp    • Gastric ulcer    • GERD (gastroesophageal reflux disease)    • History of fall 01/18/2023    fx left hand   • Hyperlipidemia    • Hypertension    • Snores    • Stomach problems      Past Surgical History:   Procedure Laterality Date   • ANGIOPLASTY      x3 stents   • ARTHROSCOPIC REPAIR ACL Left    • COLONOSCOPY     • HAND SURGERY Left 02/08/2023    2 pins in the ring and 5th finger-hx of fall     Social History   Social History     Substance and Sexual Activity   Alcohol Use Yes    Comment: Rarely      Social History     Substance and Sexual Activity   Drug Use Never     Social History     Tobacco Use   Smoking Status Never   Smokeless Tobacco Never     Family History   Problem Relation Age of Onset   • Breast cancer Maternal Aunt    • Breast cancer Maternal Aunt    • Breast cancer Maternal Aunt    • Heart disease Other    • Congenital heart disease Father        Meds/Allergies       Current Outpatient Medications:   •  acetaminophen (TYLENOL) 650 mg CR tablet  •  aspirin (ECOTRIN LOW STRENGTH) 81 mg EC tablet  •  esomeprazole (NexIUM) 40 MG capsule  •  lisinopril (ZESTRIL) 2 5 mg tablet  •  metoprolol succinate (TOPROL-XL) 25 mg 24 hr tablet  •  rosuvastatin (CRESTOR) 40 MG tablet  •  MELATONIN PO  •  Naproxen Sodium (ALEVE PO)  •  polyethylene glycol (Golytely) 4000 mL solution    Current Facility-Administered Medications:   •  lactated ringers infusion, 50 mL/hr, Intravenous, Continuous  •  lidocaine (PF) (XYLOCAINE-MPF) 1 % injection 0 5 mL, 0 5 mL, Infiltration, Once PRN    No Known Allergies    Objective /70   Pulse 60   Temp 98 °F (36 7 °C) (Temporal)   Resp 16   SpO2 98%       PHYSICAL EXAM    Gen: NAD  Head: NCAT  CV: RRR  CHEST: Clear  ABD: soft, NT/ND  EXT: no edema      ASSESSMENT/PLAN:  This is a 81y o  year old female here for EGD colonoscopy, and she is stable and optimized for her procedure

## 2023-02-27 NOTE — RESULT ENCOUNTER NOTE
Please call the patient regarding her abnormal result  Biopsy shows mild gastritis, nothing significant on biopsies  Please get labs and small bowel series as recommended after the procedures, and follow-up in my office thereafter

## 2023-09-27 ENCOUNTER — HOSPITAL ENCOUNTER (OUTPATIENT)
Dept: RADIOLOGY | Facility: HOSPITAL | Age: 73
Discharge: HOME | End: 2023-09-27
Payer: COMMERCIAL

## 2023-09-27 VITALS — HEIGHT: 65 IN | WEIGHT: 146 LBS | BODY MASS INDEX: 24.32 KG/M2

## 2023-09-27 DIAGNOSIS — Z12.31 SCREENING MAMMOGRAM FOR HIGH-RISK PATIENT: ICD-10-CM

## 2023-09-27 PROCEDURE — 77063 BREAST TOMOSYNTHESIS BI: CPT

## 2023-09-27 PROCEDURE — 77067 SCR MAMMO BI INCL CAD: CPT

## 2024-11-19 ENCOUNTER — OFFICE VISIT (OUTPATIENT)
Dept: URGENT CARE | Facility: CLINIC | Age: 74
End: 2024-11-19
Payer: COMMERCIAL

## 2024-11-19 VITALS
BODY MASS INDEX: 23.93 KG/M2 | TEMPERATURE: 97.4 F | HEART RATE: 63 BPM | RESPIRATION RATE: 20 BRPM | HEIGHT: 66 IN | SYSTOLIC BLOOD PRESSURE: 123 MMHG | OXYGEN SATURATION: 99 % | DIASTOLIC BLOOD PRESSURE: 74 MMHG

## 2024-11-19 DIAGNOSIS — T63.441A BEE STING, ACCIDENTAL OR UNINTENTIONAL, INITIAL ENCOUNTER: Primary | ICD-10-CM

## 2024-11-19 PROCEDURE — 99203 OFFICE O/P NEW LOW 30 MIN: CPT | Performed by: PHYSICIAN ASSISTANT

## 2024-11-19 RX ORDER — METHYLPREDNISOLONE 4 MG/1
TABLET ORAL
Qty: 21 TABLET | Refills: 0 | Status: SHIPPED | OUTPATIENT
Start: 2024-11-19

## 2024-11-19 NOTE — PROGRESS NOTES
[facility]        NAME: Mariola Hernández is a 74 y.o. female  : 1950    MRN: [id number]  DATE: 2024  TIME: 5:06 PM    Assessment and Plan   Bee sting, accidental or unintentional, initial encounter [T63.441A]  1. Bee sting, accidental or unintentional, initial encounter  methylPREDNISolone 4 MG tablet therapy pack            Patient Instructions       Follow up with PCP in 3-5 days.  Proceed to  ER if symptoms worsen.    If tests are performed, our office will contact you with results only if changes need to made to the care plan discussed with you at the visit. [facility]    Chief Complaint     Chief Complaint   Patient presents with    Insect Bite     Possible 3 hornet stings has swelling and pain in left hand         History of Present Illness       Insect Bite  This is a new problem. Episode onset: This morning she was working in the yard when a bee flew in her work glove and stung the dorsum of her L hand 3x. The problem occurs constantly. The problem has been gradually worsening. Pertinent negatives include no chest pain, chills, congestion, coughing, diaphoresis, fatigue, fever, headaches, myalgias, nausea, neck pain, rash or vomiting. Nothing aggravates the symptoms. Treatments tried: zyrtec. The treatment provided mild relief.       Review of Systems   Review of Systems   Constitutional:  Negative for chills, diaphoresis, fatigue and fever.   HENT:  Negative for congestion, ear pain, rhinorrhea, sinus pressure and voice change.    Eyes: Negative.    Respiratory:  Negative for cough, chest tightness and shortness of breath.    Cardiovascular:  Negative for chest pain and palpitations.   Gastrointestinal:  Negative for constipation, diarrhea, nausea and vomiting.   Endocrine: Negative.    Genitourinary:  Negative for dysuria.   Musculoskeletal:  Negative for back pain, myalgias and neck pain.   Skin:  Negative for pallor and rash.    Allergic/Immunologic: Negative.    Neurological:  Negative for dizziness, syncope and headaches.   Hematological: Negative.    Psychiatric/Behavioral: Negative.           Current Medications       Current Outpatient Medications:     acetaminophen (TYLENOL) 650 mg CR tablet, Take 650 mg by mouth every 8 (eight) hours as needed for mild pain, Disp: , Rfl:     aspirin (ECOTRIN LOW STRENGTH) 81 mg EC tablet, Take 81 mg by mouth daily, Disp: , Rfl:     esomeprazole (NexIUM) 40 MG capsule, 40 mg daily at bedtime, Disp: , Rfl:     lisinopril (ZESTRIL) 2.5 mg tablet, Take by mouth every morning Takes 1/2 tablet=1.25 mg, Disp: , Rfl:     methylPREDNISolone 4 MG tablet therapy pack, Use as directed on package, Disp: 21 tablet, Rfl: 0    Naproxen Sodium (ALEVE PO), Take by mouth if needed, Disp: , Rfl:     rosuvastatin (CRESTOR) 40 MG tablet, Take 40 mg by mouth daily at bedtime, Disp: , Rfl:     bisacodyl (DULCOLAX) 5 mg EC tablet, Take 5 mg by mouth daily as needed for constipation, Disp: , Rfl:     MELATONIN PO, Take by mouth 2 gummies at hs, Disp: , Rfl:     metoprolol succinate (TOPROL-XL) 25 mg 24 hr tablet, 12.5 mg every morning, Disp: , Rfl:     Current Allergies     Allergies as of 11/19/2024    (No Known Allergies)            The following portions of the patient's history were reviewed and updated as appropriate: allergies, current medications, past family history, past medical history, past social history, past surgical history and problem list.     Past Medical History:   Diagnosis Date    BRCA1 negative     Chronic constipation     Colon polyp     Gastric ulcer     GERD (gastroesophageal reflux disease)     History of fall 01/18/2023    fx left hand    Hyperlipidemia     Hypertension     Snores     Stomach problems        Past Surgical History:   Procedure Laterality Date    ANGIOPLASTY      x3 stents    ARTHROSCOPIC REPAIR ACL Left     COLONOSCOPY      HAND SURGERY Left 02/08/2023    2 pins in the ring and 5th  "finger-hx of fall       Family History   Problem Relation Age of Onset    Congenital heart disease Father     Breast cancer Maternal Aunt     Breast cancer Maternal Aunt     Breast cancer Maternal Aunt     Heart disease Other     Lung cancer Brother     Lung cancer Brother          Medications have been verified.        Objective   /74   Pulse 63   Temp (!) 97.4 °F (36.3 °C)   Resp 20   Ht 5' 5.5\" (1.664 m)   SpO2 99%   BMI 23.93 kg/m²        Physical Exam     Physical Exam  Vitals and nursing note reviewed.   Constitutional:       General: She is not in acute distress.     Appearance: Normal appearance. She is well-developed. She is not diaphoretic.   HENT:      Head: Normocephalic and atraumatic.      Right Ear: External ear normal.      Left Ear: External ear normal.      Mouth/Throat:      Comments: No swelling of lips tongue mouth or throat  Eyes:      General:         Right eye: No discharge.         Left eye: No discharge.      Conjunctiva/sclera: Conjunctivae normal.   Cardiovascular:      Rate and Rhythm: Normal rate and regular rhythm.      Heart sounds: Normal heart sounds.   Pulmonary:      Effort: Pulmonary effort is normal. No respiratory distress.      Breath sounds: Normal breath sounds. No wheezing, rhonchi or rales.   Musculoskeletal:      Cervical back: Normal range of motion and neck supple.      Comments: Moderate swelling of dorsum of L hand. 3 insect bites on dorsum.  No erythema or warmth   Lymphadenopathy:      Cervical: No cervical adenopathy.   Skin:     General: Skin is warm.      Findings: No rash.   Neurological:      Mental Status: She is alert.                   "

## 2024-11-19 NOTE — PATIENT INSTRUCTIONS
Continue to monitor symptoms.  If new or worsening symptoms develop, go immediately to Er. Drink plenty of fluids.  Follow up with Family Doctor this week.    If tests are performed, our office will contact you with results only if changes need to made to the care plan discussed with you at the visit. You can review your full results on [facility].     Patient Education     Insect Bites and Stings ED   General Information   You came to the Emergency Department (ED) for an insect bite or sting. When an insect bites you, it uses its mouth parts. When an insect stings you, it uses a special stinger on the back of its body. Some insects transfer blood from other people or animals they have bitten to you. This means you can get certain infections from insect bites. Insects that sting can inject you with a venom. The venom can irritate your skin. It can also be deadly for people who have a severe allergy.  Most of the time, you can care for the bite or sting at home. How long it will take to heal is based on how serious the bite or sting is.  What care is needed at home?   Call your regular doctor to let them know you were in the ED. Make a follow-up appointment if you were told to.  Keep the area clean and try not to scratch it.  Use cool compresses on the skin. They may help with swelling and itching. Dip a cloth in cold water and put it right on your itchy skin.  Use an over-the-counter cream or ointment to help with itching.  You may want to take drugs like ibuprofen, naproxen, or acetaminophen if the bite or sting is painful or very swollen.  When do I need to get emergency help?   Call for an ambulance right away if:   You have wheezing or trouble breathing.  You pass out or feel like you may pass out.  You have swelling of your face, lips, tongue, or throat.  Return to the ED if:   You have stomach cramps, upset stomach, throw up, or loose stools.  You have a fever of 100.4°F (38°C) or higher or  chills.  Your bite or sting is swollen, red, or warm.  Your bite or sting has thick, yellow, or green drainage.  When do I need to call the doctor?   You have new or worsening symptoms.  Last Reviewed Date   2021-03-02  Consumer Information Use and Disclaimer   This generalized information is a limited summary of diagnosis, treatment, and/or medication information. It is not meant to be comprehensive and should be used as a tool to help the user understand and/or assess potential diagnostic and treatment options. It does NOT include all information about conditions, treatments, medications, side effects, or risks that may apply to a specific patient. It is not intended to be medical advice or a substitute for the medical advice, diagnosis, or treatment of a health care provider based on the health care provider's examination and assessment of a patient’s specific and unique circumstances. Patients must speak with a health care provider for complete information about their health, medical questions, and treatment options, including any risks or benefits regarding use of medications. This information does not endorse any treatments or medications as safe, effective, or approved for treating a specific patient. [employer] and its affiliates disclaim any warranty or liability relating to this information or the use thereof. The use of this information is governed by the Terms of Use, available at https://www.wolterskluwer.com/en/know/clinical-effectiveness-terms   Copyright   Copyright © 2024 [employer] and its affiliates and/or licensors. All rights reserved.